# Patient Record
Sex: FEMALE | Race: WHITE | NOT HISPANIC OR LATINO | Employment: OTHER | URBAN - METROPOLITAN AREA
[De-identification: names, ages, dates, MRNs, and addresses within clinical notes are randomized per-mention and may not be internally consistent; named-entity substitution may affect disease eponyms.]

---

## 2021-01-01 ENCOUNTER — OFFICE VISIT (OUTPATIENT)
Dept: URBAN - METROPOLITAN AREA CLINIC 68 | Facility: CLINIC | Age: 71
End: 2021-01-01

## 2021-03-01 ENCOUNTER — OFFICE VISIT (OUTPATIENT)
Dept: URBAN - METROPOLITAN AREA CLINIC 68 | Facility: CLINIC | Age: 71
End: 2021-03-01

## 2021-03-02 ENCOUNTER — OFFICE VISIT (OUTPATIENT)
Dept: URBAN - METROPOLITAN AREA CLINIC 68 | Facility: CLINIC | Age: 71
End: 2021-03-02

## 2021-03-08 ENCOUNTER — OFFICE VISIT (OUTPATIENT)
Dept: URBAN - METROPOLITAN AREA SURGERY CENTER 12 | Facility: SURGERY CENTER | Age: 71
End: 2021-03-08

## 2021-03-09 ENCOUNTER — LAB OUTSIDE AN ENCOUNTER (OUTPATIENT)
Dept: URBAN - METROPOLITAN AREA CLINIC 68 | Facility: CLINIC | Age: 71
End: 2021-03-09

## 2021-03-09 LAB — 01: (no result)

## 2021-03-11 ENCOUNTER — TELEPHONE ENCOUNTER (OUTPATIENT)
Dept: URBAN - METROPOLITAN AREA CLINIC 68 | Facility: CLINIC | Age: 71
End: 2021-03-11

## 2021-04-22 ENCOUNTER — NEW PATIENT COMPREHENSIVE (OUTPATIENT)
Dept: URBAN - METROPOLITAN AREA CLINIC 32 | Facility: CLINIC | Age: 71
End: 2021-04-22

## 2021-04-22 DIAGNOSIS — H25.13: ICD-10-CM

## 2021-04-22 DIAGNOSIS — H25.013: ICD-10-CM

## 2021-04-22 DIAGNOSIS — H11.122: ICD-10-CM

## 2021-04-22 PROCEDURE — 65210 REMOVE FOREIGN BODY FROM EYE: CPT

## 2021-04-22 PROCEDURE — 92004 COMPRE OPH EXAM NEW PT 1/>: CPT

## 2021-04-22 PROCEDURE — 92015 DETERMINE REFRACTIVE STATE: CPT

## 2021-04-22 RX ORDER — HYDROXYZINE HYDROCHLORIDE 25 MG/1
1 TABLET, FILM COATED ORAL TWICE A DAY
Start: 2021-04-22 | End: 2021-04-25

## 2021-04-22 ASSESSMENT — KERATOMETRY
OS_K1POWER_DIOPTERS: 47.00
OD_AXISANGLE2_DEGREES: 113
OS_K2POWER_DIOPTERS: 46.50
OD_K1POWER_DIOPTERS: 47.50
OD_AXISANGLE_DEGREES: 23
OD_K2POWER_DIOPTERS: 46.25
OS_AXISANGLE_DEGREES: 124
OS_AXISANGLE2_DEGREES: 34

## 2021-04-22 ASSESSMENT — VISUAL ACUITY
OS_CC: 20/30
OS_SC: 20/40+2
OD_CC: 20/30
OD_SC: 20/40
OD_PH: 20/30
OD_CC: J1
OS_CC: J2
OS_PH: 20/30-1

## 2021-04-22 ASSESSMENT — TONOMETRY
OD_IOP_MMHG: 13
OS_IOP_MMHG: 14

## 2021-09-01 ENCOUNTER — OFFICE VISIT (OUTPATIENT)
Dept: URBAN - METROPOLITAN AREA CLINIC 68 | Facility: CLINIC | Age: 71
End: 2021-09-01

## 2022-03-03 ENCOUNTER — OFFICE VISIT (OUTPATIENT)
Dept: URBAN - METROPOLITAN AREA CLINIC 68 | Facility: CLINIC | Age: 72
End: 2022-03-03

## 2022-03-04 ENCOUNTER — TELEPHONE ENCOUNTER (OUTPATIENT)
Dept: URBAN - METROPOLITAN AREA CLINIC 68 | Facility: CLINIC | Age: 72
End: 2022-03-04

## 2022-03-09 ENCOUNTER — OFFICE VISIT (OUTPATIENT)
Dept: URBAN - METROPOLITAN AREA CLINIC 68 | Facility: CLINIC | Age: 72
End: 2022-03-09

## 2022-03-21 ENCOUNTER — OFFICE VISIT (OUTPATIENT)
Dept: URBAN - METROPOLITAN AREA CLINIC 68 | Facility: CLINIC | Age: 72
End: 2022-03-21

## 2022-03-29 ENCOUNTER — OFFICE VISIT (OUTPATIENT)
Dept: URBAN - METROPOLITAN AREA CLINIC 68 | Facility: CLINIC | Age: 72
End: 2022-03-29

## 2022-04-25 ENCOUNTER — COMPREHENSIVE EXAM (OUTPATIENT)
Dept: URBAN - METROPOLITAN AREA CLINIC 32 | Facility: CLINIC | Age: 72
End: 2022-04-25

## 2022-04-25 DIAGNOSIS — H25.13: ICD-10-CM

## 2022-04-25 DIAGNOSIS — H25.013: ICD-10-CM

## 2022-04-25 DIAGNOSIS — H35.373: ICD-10-CM

## 2022-04-25 PROCEDURE — 92134 CPTRZ OPH DX IMG PST SGM RTA: CPT

## 2022-04-25 PROCEDURE — 92014 COMPRE OPH EXAM EST PT 1/>: CPT

## 2022-04-25 PROCEDURE — 92015 DETERMINE REFRACTIVE STATE: CPT

## 2022-04-25 ASSESSMENT — KERATOMETRY
OD_AXISANGLE_DEGREES: 23
OS_K2POWER_DIOPTERS: 46.50
OD_K1POWER_DIOPTERS: 47.50
OS_AXISANGLE2_DEGREES: 34
OS_AXISANGLE_DEGREES: 124
OD_K2POWER_DIOPTERS: 46.25
OS_K1POWER_DIOPTERS: 47.00
OD_AXISANGLE2_DEGREES: 113

## 2022-04-25 ASSESSMENT — VISUAL ACUITY
OS_CC: 20/50
OD_CC: J1+
OS_CC: J1+
OD_CC: 20/40
OS_GLARE: 20/80
OD_GLARE: 20/100

## 2022-04-25 ASSESSMENT — TONOMETRY
OS_IOP_MMHG: 16
OD_IOP_MMHG: 13

## 2022-04-29 ENCOUNTER — OFFICE VISIT (OUTPATIENT)
Dept: URBAN - METROPOLITAN AREA SURGERY CENTER 12 | Facility: SURGERY CENTER | Age: 72
End: 2022-04-29

## 2022-05-02 ENCOUNTER — LAB OUTSIDE AN ENCOUNTER (OUTPATIENT)
Age: 72
End: 2022-05-02

## 2022-05-02 LAB — 01: (no result)

## 2022-05-10 ENCOUNTER — TELEPHONE ENCOUNTER (OUTPATIENT)
Dept: URBAN - METROPOLITAN AREA CLINIC 68 | Facility: CLINIC | Age: 72
End: 2022-05-10

## 2022-06-04 ENCOUNTER — TELEPHONE ENCOUNTER (OUTPATIENT)
Dept: URBAN - METROPOLITAN AREA CLINIC 68 | Facility: CLINIC | Age: 72
End: 2022-06-04

## 2022-06-04 RX ORDER — ESZOPICLONE 3 MG/1
ESZOPICLONE( 3MG ORAL  AS DIRECTED ) INACTIVE -HX ENTRY TABLET, FILM COATED OROPHARYNGEAL AS DIRECTED
OUTPATIENT
Start: 2021-03-02

## 2022-06-04 RX ORDER — ESZOPICLONE 3 MG/1
LUNESTA( 3MG ORAL 1 AT BEDTIME ) INACTIVE -HX ENTRY TABLET, COATED ORAL AT BEDTIME
OUTPATIENT
Start: 2020-03-12

## 2022-06-04 RX ORDER — POLYETHYLENE GLYCOL 3350, SODIUM SULFATE, SODIUM CHLORIDE, POTASSIUM CHLORIDE, ASCORBIC ACID, SODIUM ASCORBATE 7.5-2.691G
KIT ORAL AS DIRECTED
Qty: 1 | Refills: 0 | OUTPATIENT
Start: 2014-08-04 | End: 2014-08-05

## 2022-06-04 RX ORDER — OMEPRAZOLE 40 MG/1
OMEPRAZOLE( 40MG ORAL   ) UNDEFINED -HX ENTRY CAPSULE, DELAYED RELEASE PELLETS ORAL
OUTPATIENT
Start: 2016-10-31

## 2022-06-04 RX ORDER — HYDROCODONE BITARTRATE AND ACETAMINOPHEN 10; 325 MG/1; MG/1
HYDROCODONE-ACETAMINOPHEN( 10-325MG ORAL   ) UNDEFINED -HX ENTRY TABLET ORAL
OUTPATIENT
Start: 2016-11-03

## 2022-06-04 RX ORDER — SODIUM SULFATE, POTASSIUM SULFATE, MAGNESIUM SULFATE 17.5; 3.13; 1.6 G/ML; G/ML; G/ML
SOLUTION, CONCENTRATE ORAL AS DIRECTED
Qty: 1 | Refills: 0 | OUTPATIENT
Start: 2021-03-02 | End: 2021-03-03

## 2022-06-04 RX ORDER — VALACYCLOVIR 1 G/1
VALACYCLOVIR HCL( 1GM ORAL   ) UNDEFINED -HX ENTRY TABLET, FILM COATED ORAL
OUTPATIENT
Start: 2016-11-03

## 2022-06-04 RX ORDER — CANAGLIFLOZIN 300 MG/1
INVOKANA( 300MG ORAL 1 DAILY ) INACTIVE -HX ENTRY TABLET, FILM COATED ORAL DAILY
OUTPATIENT
Start: 2020-03-12

## 2022-06-04 RX ORDER — ESZOPICLONE 3 MG/1
ESZOPICLONE( 3MG ORAL   ) UNDEFINED -HX ENTRY TABLET, FILM COATED OROPHARYNGEAL
OUTPATIENT
Start: 2016-11-03

## 2022-06-04 RX ORDER — TIZANIDINE HYDROCHLORIDE 4 MG/1
TIZANIDINE HCL( 4MG ORAL   ) UNDEFINED -HX ENTRY CAPSULE ORAL
OUTPATIENT
Start: 2016-10-31

## 2022-06-04 RX ORDER — CANAGLIFLOZIN 300 MG/1
INVOKANA( 300MG ORAL  DAILY ) INACTIVE -HX ENTRY TABLET, FILM COATED ORAL DAILY
OUTPATIENT
Start: 2021-03-02

## 2022-06-04 RX ORDER — EFINACONAZOLE 100 MG/ML
JUBLIA( 10% EXTERNAL   ) UNDEFINED -HX ENTRY SOLUTION TOPICAL
OUTPATIENT
Start: 2016-11-03

## 2022-06-05 ENCOUNTER — TELEPHONE ENCOUNTER (OUTPATIENT)
Dept: URBAN - METROPOLITAN AREA CLINIC 68 | Facility: CLINIC | Age: 72
End: 2022-06-05

## 2022-06-05 RX ORDER — ROSUVASTATIN CALCIUM 20 MG
CRESTOR( 20MG ORAL 1 DAILY ) ACTIVE -HX ENTRY TABLET ORAL DAILY
Status: ACTIVE | COMMUNITY
Start: 2022-03-03

## 2022-06-05 RX ORDER — TIZANIDINE HYDROCHLORIDE 4 MG/1
ZANAFLEX( 4MG ORAL 1 TWO TIMES DAILY ) ACTIVE -HX ENTRY CAPSULE ORAL
Status: ACTIVE | COMMUNITY
Start: 2022-03-03

## 2022-06-05 RX ORDER — OMEPRAZOLE 40 MG/1
OMEPRAZOLE( 40MG ORAL 1 DAILY ) ACTIVE -HX ENTRY CAPSULE, DELAYED RELEASE PELLETS ORAL DAILY
Status: ACTIVE | COMMUNITY
Start: 2022-03-03

## 2022-06-05 RX ORDER — CHLORHEXIDINE GLUCONATE 4 %
MULTIVITAMIN ADULTS(  ORAL   ) ACTIVE -HX ENTRY LIQUID (ML) TOPICAL
Status: ACTIVE | COMMUNITY
Start: 2022-03-03

## 2022-06-05 RX ORDER — AZATHIOPRINE 50 1/1
AZATHIOPRINE( 50MG ORAL 1 DAILY ) ACTIVE -HX ENTRY TABLET ORAL DAILY
Status: ACTIVE | COMMUNITY
Start: 2022-03-03

## 2022-06-05 RX ORDER — METFORMIN HYDROCHLORIDE 500 MG/1
METFORMIN HCL( 500MG ORAL 1 TWO TIMES DAILY ) ACTIVE -HX ENTRY TABLET, COATED ORAL
Status: ACTIVE | COMMUNITY
Start: 2022-03-03

## 2022-06-25 ENCOUNTER — TELEPHONE ENCOUNTER (OUTPATIENT)
Age: 72
End: 2022-06-25

## 2022-06-25 RX ORDER — VALACYCLOVIR 1 G/1
VALACYCLOVIR HCL( 1GM ORAL   ) UNDEFINED -HX ENTRY TABLET, FILM COATED ORAL
OUTPATIENT
Start: 2016-11-03

## 2022-06-25 RX ORDER — HYDROCODONE BITARTRATE AND ACETAMINOPHEN 325; 10 MG/1; MG/1
HYDROCODONE-ACETAMINOPHEN( 10-325MG ORAL   ) UNDEFINED -HX ENTRY TABLET ORAL
OUTPATIENT
Start: 2016-11-03

## 2022-06-25 RX ORDER — OMEPRAZOLE 40 MG/1
OMEPRAZOLE( 40MG ORAL   ) UNDEFINED -HX ENTRY CAPSULE, DELAYED RELEASE ORAL
OUTPATIENT
Start: 2016-10-31

## 2022-06-25 RX ORDER — TIZANIDINE HYDROCHLORIDE 4 MG/1
TIZANIDINE HCL( 4MG ORAL   ) UNDEFINED -HX ENTRY CAPSULE ORAL
OUTPATIENT
Start: 2016-10-31

## 2022-06-25 RX ORDER — ESZOPICLONE 3 MG/1
ESZOPICLONE( 3MG ORAL   ) UNDEFINED -HX ENTRY TABLET, FILM COATED ORAL
OUTPATIENT
Start: 2016-11-03

## 2022-06-25 RX ORDER — CANAGLIFLOZIN 300 MG/1
INVOKANA( 300MG ORAL  DAILY ) INACTIVE -HX ENTRY TABLET, FILM COATED ORAL DAILY
OUTPATIENT
Start: 2021-03-02

## 2022-06-25 RX ORDER — OMEGA-3/DHA/EPA/FISH OIL 1000 MG
FISH OIL( 1000MG ORAL  DAILY ) INACTIVE -HX ENTRY CAPSULE ORAL DAILY
OUTPATIENT
Start: 2021-03-02

## 2022-06-25 RX ORDER — POLYETHYLENE GLYCOL 3350, SODIUM SULFATE, SODIUM CHLORIDE, POTASSIUM CHLORIDE, ASCORBIC ACID, SODIUM ASCORBATE 7.5-2.691G
KIT ORAL AS DIRECTED
Qty: 1 | Refills: 0 | OUTPATIENT
Start: 2014-08-04 | End: 2014-08-05

## 2022-06-25 RX ORDER — EFINACONAZOLE 100 MG/ML
JUBLIA( 10% EXTERNAL   ) UNDEFINED -HX ENTRY SOLUTION TOPICAL
OUTPATIENT
Start: 2016-11-03

## 2022-06-25 RX ORDER — ESZOPICLONE 3 MG/1
ESZOPICLONE( 3MG ORAL  AS DIRECTED ) INACTIVE -HX ENTRY TABLET, FILM COATED ORAL AS DIRECTED
OUTPATIENT
Start: 2021-03-02

## 2022-06-25 RX ORDER — SODIUM SULFATE, POTASSIUM SULFATE, MAGNESIUM SULFATE 17.5; 3.13; 1.6 G/ML; G/ML; G/ML
SOLUTION, CONCENTRATE ORAL AS DIRECTED
Qty: 1 | Refills: 0 | OUTPATIENT
Start: 2021-03-02 | End: 2021-03-03

## 2022-06-25 RX ORDER — OMEPRAZOLE MAGNESIUM 10 MG/1
PRILOSEC( 40MG ORAL  DAILY ) INACTIVE -HX ENTRY GRANULE, DELAYED RELEASE ORAL DAILY
OUTPATIENT
Start: 2020-03-12

## 2022-06-25 RX ORDER — CANAGLIFLOZIN 300 MG/1
INVOKANA( 300MG ORAL 1 DAILY ) INACTIVE -HX ENTRY TABLET, FILM COATED ORAL DAILY
OUTPATIENT
Start: 2020-03-12

## 2022-06-25 RX ORDER — ESZOPICLONE 3 MG/1
LUNESTA( 3MG ORAL 1 AT BEDTIME ) INACTIVE -HX ENTRY TABLET, COATED ORAL AT BEDTIME
OUTPATIENT
Start: 2020-03-12

## 2022-06-26 ENCOUNTER — TELEPHONE ENCOUNTER (OUTPATIENT)
Age: 72
End: 2022-06-26

## 2022-06-26 RX ORDER — HYDROCODONE BITARTRATE AND ACETAMINOPHEN 10; 300 MG/1; MG/1
HYDROCODONE-ACETAMINOPHEN( 10-500MG ORAL 1 AS DIRECTED ) ACTIVE -HX ENTRY TABLET ORAL AS DIRECTED
Status: ACTIVE | COMMUNITY
Start: 2022-03-03

## 2022-06-26 RX ORDER — CHLORHEXIDINE GLUCONATE 4 %
MULTIVITAMIN ADULTS(  ORAL   ) ACTIVE -HX ENTRY LIQUID (ML) TOPICAL
Status: ACTIVE | COMMUNITY
Start: 2022-03-03

## 2022-06-26 RX ORDER — METFORMIN HCL 500 MG/1
METFORMIN HCL( 500MG ORAL 1 TWO TIMES DAILY ) ACTIVE -HX ENTRY TABLET ORAL
Status: ACTIVE | COMMUNITY
Start: 2022-03-03

## 2022-06-26 RX ORDER — ROSUVASTATIN CALCIUM 20 MG
CRESTOR( 20MG ORAL 1 DAILY ) ACTIVE -HX ENTRY TABLET ORAL DAILY
Status: ACTIVE | COMMUNITY
Start: 2022-03-03

## 2022-06-26 RX ORDER — ASPIRIN 81 MG/1
LOW-DOSE ASPIRIN( 81MG ORAL  DAILY ) ACTIVE -HX ENTRY TABLET, COATED ORAL DAILY
Status: ACTIVE | COMMUNITY
Start: 2022-03-03

## 2022-06-26 RX ORDER — AZATHIOPRINE 50 MG/1
AZATHIOPRINE( 50MG ORAL 1 DAILY ) ACTIVE -HX ENTRY TABLET ORAL DAILY
Status: ACTIVE | COMMUNITY
Start: 2022-03-03

## 2022-06-26 RX ORDER — CHOLECALCIFEROL (VITAMIN D3) 25 MCG
VITAMIN D( 1000UNIT ORAL  DAILY ) ACTIVE -HX ENTRY TABLET ORAL DAILY
Status: ACTIVE | COMMUNITY
Start: 2022-03-03

## 2022-06-26 RX ORDER — OMEPRAZOLE 40 MG/1
OMEPRAZOLE( 40MG ORAL 1 DAILY ) ACTIVE -HX ENTRY CAPSULE, DELAYED RELEASE ORAL DAILY
Status: ACTIVE | COMMUNITY
Start: 2022-03-03

## 2022-06-26 RX ORDER — TIZANIDINE HYDROCHLORIDE 4 MG/1
ZANAFLEX( 4MG ORAL 1 TWO TIMES DAILY ) ACTIVE -HX ENTRY CAPSULE ORAL
Status: ACTIVE | COMMUNITY
Start: 2022-03-03

## 2022-09-19 ENCOUNTER — DIAGNOSTICS ONLY (OUTPATIENT)
Dept: URBAN - METROPOLITAN AREA CLINIC 32 | Facility: CLINIC | Age: 72
End: 2022-09-19

## 2022-09-19 ENCOUNTER — ADDENDUM (OUTPATIENT)
Dept: URBAN - METROPOLITAN AREA CLINIC 32 | Facility: CLINIC | Age: 72
End: 2022-09-19

## 2022-09-19 DIAGNOSIS — H25.013: ICD-10-CM

## 2022-09-19 DIAGNOSIS — E11.9: ICD-10-CM

## 2022-09-19 DIAGNOSIS — H35.373: ICD-10-CM

## 2022-09-19 DIAGNOSIS — H25.13: ICD-10-CM

## 2022-09-19 PROCEDURE — 92025 CPTRIZED CORNEAL TOPOGRAPHY: CPT

## 2022-09-19 PROCEDURE — 92134 CPTRZ OPH DX IMG PST SGM RTA: CPT

## 2022-09-19 PROCEDURE — V2799PMN IMPRIMIS PRED-MOXI-NEPAF 5ML

## 2022-09-19 PROCEDURE — 92286 ANT SGM IMG I&R SPECLR MIC: CPT

## 2022-09-19 PROCEDURE — 99214 OFFICE O/P EST MOD 30 MIN: CPT

## 2022-09-19 PROCEDURE — 92136TC INTERFEROMETRY - TECHNICAL COMPONENT

## 2022-09-19 ASSESSMENT — VISUAL ACUITY
OS_CC: J1+
OD_CC: J1+
OD_SC: 20/40
OS_GLARE: 20/80
OS_SC: 20/40
OS_SC: J1
OD_GLARE: 20/100
OD_CC: 20/25-2
OS_CC: 20/30
OD_SC: J1

## 2022-09-19 ASSESSMENT — KERATOMETRY
OD_K1POWER_DIOPTERS: 47.50
OD_AXISANGLE_DEGREES: 23
OS_K1POWER_DIOPTERS: 47.00
OS_AXISANGLE_DEGREES: 124
OD_AXISANGLE2_DEGREES: 113
OS_K2POWER_DIOPTERS: 46.50
OD_K2POWER_DIOPTERS: 46.25
OS_AXISANGLE2_DEGREES: 34

## 2022-09-20 ASSESSMENT — KERATOMETRY
OD_AXISANGLE_DEGREES: 23
OS_AXISANGLE_DEGREES: 124
OD_K2POWER_DIOPTERS: 46.25
OS_K1POWER_DIOPTERS: 47.00
OS_K2POWER_DIOPTERS: 46.50
OD_AXISANGLE2_DEGREES: 113
OS_AXISANGLE2_DEGREES: 34
OD_K1POWER_DIOPTERS: 47.50

## 2022-09-26 ENCOUNTER — ADDENDUM (OUTPATIENT)
Dept: URBAN - METROPOLITAN AREA CLINIC 32 | Facility: CLINIC | Age: 72
End: 2022-09-26

## 2022-09-26 ASSESSMENT — KERATOMETRY
OS_K1POWER_DIOPTERS: 47.00
OD_AXISANGLE_DEGREES: 23
OD_AXISANGLE2_DEGREES: 113
OD_K2POWER_DIOPTERS: 46.25
OD_K1POWER_DIOPTERS: 47.50
OS_AXISANGLE_DEGREES: 124
OS_K2POWER_DIOPTERS: 46.50
OS_AXISANGLE2_DEGREES: 34

## 2022-09-27 ENCOUNTER — POST-OP (OUTPATIENT)
Dept: URBAN - METROPOLITAN AREA CLINIC 32 | Facility: CLINIC | Age: 72
End: 2022-09-27

## 2022-09-27 ENCOUNTER — SURGERY/PROCEDURE (OUTPATIENT)
Dept: URBAN - METROPOLITAN AREA CLINIC 32 | Facility: CLINIC | Age: 72
End: 2022-09-27

## 2022-09-27 DIAGNOSIS — H25.012: ICD-10-CM

## 2022-09-27 DIAGNOSIS — H25.12: ICD-10-CM

## 2022-09-27 DIAGNOSIS — Z96.1: ICD-10-CM

## 2022-09-27 PROCEDURE — 99024 POSTOP FOLLOW-UP VISIT: CPT

## 2022-09-27 PROCEDURE — 66984AV REMOVE CATARACT, INSERT ADVANCED LENS

## 2022-09-27 ASSESSMENT — KERATOMETRY
OD_K2POWER_DIOPTERS: 46.25
OS_AXISANGLE2_DEGREES: 34
OS_AXISANGLE_DEGREES: 124
OS_K1POWER_DIOPTERS: 47.00
OD_K1POWER_DIOPTERS: 47.50
OD_AXISANGLE_DEGREES: 23
OD_AXISANGLE2_DEGREES: 113
OS_K2POWER_DIOPTERS: 46.50

## 2022-09-27 ASSESSMENT — TONOMETRY: OD_IOP_MMHG: 11

## 2022-09-27 ASSESSMENT — VISUAL ACUITY: OD_SC: 20/70-1

## 2022-09-30 ASSESSMENT — KERATOMETRY
OD_AXISANGLE2_DEGREES: 113
OS_K1POWER_DIOPTERS: 47.00
OS_AXISANGLE2_DEGREES: 34
OD_AXISANGLE_DEGREES: 23
OS_K2POWER_DIOPTERS: 46.50
OS_AXISANGLE_DEGREES: 124
OD_K1POWER_DIOPTERS: 47.50
OD_K2POWER_DIOPTERS: 46.25

## 2022-10-03 ENCOUNTER — POST-OP (OUTPATIENT)
Dept: URBAN - METROPOLITAN AREA CLINIC 32 | Facility: CLINIC | Age: 72
End: 2022-10-03

## 2022-10-03 DIAGNOSIS — Z96.1: ICD-10-CM

## 2022-10-03 DIAGNOSIS — H25.11: ICD-10-CM

## 2022-10-03 PROCEDURE — 99213 OFFICE O/P EST LOW 20 MIN: CPT

## 2022-10-03 PROCEDURE — V2799PMN IMPRIMIS PRED-MOXI-NEPAF 5ML

## 2022-10-03 ASSESSMENT — KERATOMETRY
OS_AXISANGLE_DEGREES: 124
OS_K1POWER_DIOPTERS: 47.00
OD_AXISANGLE2_DEGREES: 113
OD_AXISANGLE_DEGREES: 23
OS_AXISANGLE2_DEGREES: 34
OD_K1POWER_DIOPTERS: 47.50
OS_K2POWER_DIOPTERS: 46.50
OD_K2POWER_DIOPTERS: 46.25

## 2022-10-03 ASSESSMENT — TONOMETRY: OS_IOP_MMHG: 11

## 2022-10-03 ASSESSMENT — VISUAL ACUITY
OD_GLARE: 20/100
OD_SC: 20/40
OS_SC: 20/30

## 2022-10-11 ENCOUNTER — SURGERY/PROCEDURE (OUTPATIENT)
Dept: URBAN - METROPOLITAN AREA CLINIC 32 | Facility: CLINIC | Age: 72
End: 2022-10-11

## 2022-10-11 DIAGNOSIS — H25.11: ICD-10-CM

## 2022-10-11 PROCEDURE — 66984AV REMOVE CATARACT, INSERT ADVANCED LENS

## 2022-10-12 ENCOUNTER — POST-OP (OUTPATIENT)
Dept: URBAN - METROPOLITAN AREA CLINIC 32 | Facility: CLINIC | Age: 72
End: 2022-10-12

## 2022-10-12 DIAGNOSIS — Z96.1: ICD-10-CM

## 2022-10-12 PROCEDURE — 99024 POSTOP FOLLOW-UP VISIT: CPT

## 2022-10-12 ASSESSMENT — KERATOMETRY
OD_K1POWER_DIOPTERS: 47.50
OS_K1POWER_DIOPTERS: 47.00
OS_K1POWER_DIOPTERS: 47.00
OS_AXISANGLE2_DEGREES: 34
OS_AXISANGLE_DEGREES: 124
OD_AXISANGLE2_DEGREES: 113
OD_K1POWER_DIOPTERS: 47.50
OD_K2POWER_DIOPTERS: 46.25
OD_AXISANGLE_DEGREES: 23
OS_AXISANGLE_DEGREES: 124
OS_AXISANGLE2_DEGREES: 34
OS_K2POWER_DIOPTERS: 46.50
OD_K2POWER_DIOPTERS: 46.25
OD_AXISANGLE2_DEGREES: 113
OS_K2POWER_DIOPTERS: 46.50
OD_AXISANGLE_DEGREES: 23

## 2022-10-12 ASSESSMENT — TONOMETRY: OD_IOP_MMHG: 12

## 2022-10-12 ASSESSMENT — VISUAL ACUITY: OD_SC: 20/200

## 2022-10-17 ENCOUNTER — POST-OP (OUTPATIENT)
Dept: URBAN - METROPOLITAN AREA CLINIC 32 | Facility: CLINIC | Age: 72
End: 2022-10-17

## 2022-10-17 DIAGNOSIS — Z96.1: ICD-10-CM

## 2022-10-17 PROCEDURE — 99024 POSTOP FOLLOW-UP VISIT: CPT

## 2022-10-17 ASSESSMENT — KERATOMETRY
OD_K1POWER_DIOPTERS: 47.50
OS_AXISANGLE2_DEGREES: 34
OD_AXISANGLE_DEGREES: 23
OD_K2POWER_DIOPTERS: 46.25
OD_K2POWER_DIOPTERS: 47.25
OD_AXISANGLE_DEGREES: 20
OD_AXISANGLE2_DEGREES: 110
OD_K1POWER_DIOPTERS: 48.00
OS_AXISANGLE_DEGREES: 124
OD_AXISANGLE2_DEGREES: 113
OS_K1POWER_DIOPTERS: 47.00
OS_K2POWER_DIOPTERS: 46.50

## 2022-10-17 ASSESSMENT — VISUAL ACUITY
OS_SC: 20/30-2
OD_SC: J2
OD_SC: 20/40-2

## 2022-11-14 ENCOUNTER — POST-OP (OUTPATIENT)
Dept: URBAN - METROPOLITAN AREA CLINIC 32 | Facility: CLINIC | Age: 72
End: 2022-11-14

## 2022-11-14 DIAGNOSIS — H35.373: ICD-10-CM

## 2022-11-14 DIAGNOSIS — Z96.1: ICD-10-CM

## 2022-11-14 DIAGNOSIS — H16.223: ICD-10-CM

## 2022-11-14 PROCEDURE — 99024 POSTOP FOLLOW-UP VISIT: CPT

## 2022-11-14 PROCEDURE — 92134 CPTRZ OPH DX IMG PST SGM RTA: CPT

## 2022-11-14 ASSESSMENT — VISUAL ACUITY
OD_SC: 20/30-2
OS_SC: J1
OS_SC: 20/40
OD_SC: J2

## 2022-11-14 ASSESSMENT — TONOMETRY
OD_IOP_MMHG: 13
OS_IOP_MMHG: 14

## 2023-06-26 ENCOUNTER — WEB ENCOUNTER (OUTPATIENT)
Dept: URBAN - METROPOLITAN AREA CLINIC 68 | Facility: CLINIC | Age: 73
End: 2023-06-26

## 2023-06-26 ENCOUNTER — OFFICE VISIT (OUTPATIENT)
Dept: URBAN - METROPOLITAN AREA CLINIC 68 | Facility: CLINIC | Age: 73
End: 2023-06-26
Payer: MEDICARE

## 2023-06-26 ENCOUNTER — LAB OUTSIDE AN ENCOUNTER (OUTPATIENT)
Dept: URBAN - METROPOLITAN AREA CLINIC 68 | Facility: CLINIC | Age: 73
End: 2023-06-26

## 2023-06-26 VITALS
HEIGHT: 63 IN | SYSTOLIC BLOOD PRESSURE: 118 MMHG | DIASTOLIC BLOOD PRESSURE: 80 MMHG | BODY MASS INDEX: 27.46 KG/M2 | WEIGHT: 155 LBS

## 2023-06-26 DIAGNOSIS — K74.69 CIRRHOSIS, CRYPTOGENIC: ICD-10-CM

## 2023-06-26 DIAGNOSIS — E11.8 TYPE 2 DIABETES MELLITUS WITH UNSPECIFIED COMPLICATIONS: ICD-10-CM

## 2023-06-26 DIAGNOSIS — K75.81 NASH (NONALCOHOLIC STEATOHEPATITIS): ICD-10-CM

## 2023-06-26 DIAGNOSIS — Z86.010 PERSONAL HISTORY OF COLONIC POLYPS: ICD-10-CM

## 2023-06-26 DIAGNOSIS — K21.00 GASTRO-ESOPHAGEAL REFLUX DISEASE WITH ESOPHAGITIS: ICD-10-CM

## 2023-06-26 PROBLEM — 442685003: Status: ACTIVE | Noted: 2023-06-26

## 2023-06-26 PROCEDURE — 99214 OFFICE O/P EST MOD 30 MIN: CPT | Performed by: SPECIALIST

## 2023-06-26 RX ORDER — EMPAGLIFLOZIN 10 MG/1
1 TABLET TABLET, FILM COATED ORAL ONCE A DAY
Status: ACTIVE | COMMUNITY

## 2023-06-26 RX ORDER — OMEPRAZOLE 40 MG/1
OMEPRAZOLE( 40MG ORAL 1 DAILY ) ACTIVE -HX ENTRY CAPSULE, DELAYED RELEASE ORAL DAILY
OUTPATIENT
Start: 2022-03-03

## 2023-06-26 RX ORDER — AZATHIOPRINE 50 MG/1
AZATHIOPRINE( 50MG ORAL 1 DAILY ) ACTIVE -HX ENTRY TABLET ORAL DAILY
Status: ACTIVE | COMMUNITY
Start: 2022-03-03

## 2023-06-26 RX ORDER — CHLORHEXIDINE GLUCONATE 4 %
MULTIVITAMIN ADULTS(  ORAL   ) ACTIVE -HX ENTRY LIQUID (ML) TOPICAL
COMMUNITY
Start: 2022-03-03

## 2023-06-26 RX ORDER — METFORMIN HCL 500 MG/1
METFORMIN HCL( 500MG ORAL 1 TWO TIMES DAILY ) ACTIVE -HX ENTRY TABLET ORAL
Status: ACTIVE | COMMUNITY
Start: 2022-03-03

## 2023-06-26 RX ORDER — ASPIRIN 81 MG/1
LOW-DOSE ASPIRIN( 81MG ORAL  DAILY ) ACTIVE -HX ENTRY TABLET, COATED ORAL DAILY
COMMUNITY
Start: 2022-03-03

## 2023-06-26 RX ORDER — OMEPRAZOLE 40 MG/1
OMEPRAZOLE( 40MG ORAL 1 DAILY ) ACTIVE -HX ENTRY CAPSULE, DELAYED RELEASE ORAL DAILY
Status: ACTIVE | COMMUNITY
Start: 2022-03-03

## 2023-06-26 RX ORDER — ROSUVASTATIN CALCIUM 20 MG/1
1 TABLET TABLET, COATED ORAL ONCE A DAY
Status: ACTIVE | COMMUNITY

## 2023-06-26 RX ORDER — TIZANIDINE HYDROCHLORIDE 4 MG/1
ZANAFLEX( 4MG ORAL 1 TWO TIMES DAILY ) ACTIVE -HX ENTRY CAPSULE ORAL
Status: ACTIVE | COMMUNITY
Start: 2022-03-03

## 2023-06-26 NOTE — HPI-TODAY'S VISIT:
Known history of cirrhosis cryptogenic probably due to steatohepatitis no significant alcohol history in the past patient does have obesity and diabetes currently on therapy with Jardiance and Elsy blood glucose level was 150 Patient was seen to have grade 1 varices in 2022 EGD 2022 colonoscopy negative for lesions No sign of ascites bleeding or other complications  Last FibroScan F3 fibrosis 1 year ago No alarm symptoms of lower GI bleeding or other symptoms

## 2023-06-28 LAB
ABSOLUTE BASOPHILS: 41
ABSOLUTE EOSINOPHILS: 271
ABSOLUTE LYMPHOCYTES: 968
ABSOLUTE MONOCYTES: 394
ABSOLUTE NEUTROPHILS: 2427
AFP, SERUM, TUMOR MARKER: 2.4
ALBUMIN/GLOBULIN RATIO: 1.7
ALBUMIN: 4.2
ALKALINE PHOSPHATASE: 62
ALT (SGPT): 12
AST (SGOT): 19
BASOPHILS: 1
BILIRUBIN, DIRECT: 0.1
BILIRUBIN, INDIRECT: 0.5
BILIRUBIN, TOTAL: 0.6
EOSINOPHILS: 6.6
GLOBULIN: 2.5
HEMATOCRIT: 39.6
HEMOGLOBIN: 12.9
INR: 1.1
LYMPHOCYTES: 23.6
MCH: 27.9
MCHC: 32.6
MCV: 85.5
MONOCYTES: 9.6
MPV: 10.1
NEUTROPHILS: 59.2
PLATELET COUNT: 197
PROTEIN, TOTAL: 6.7
PT: 11.2
RDW: 13.1
RED BLOOD CELL COUNT: 4.63
WHITE BLOOD CELL COUNT: 4.1

## 2023-07-18 ENCOUNTER — CLAIMS CREATED FROM THE CLAIM WINDOW (OUTPATIENT)
Dept: URBAN - METROPOLITAN AREA CLINIC 67 | Facility: CLINIC | Age: 73
End: 2023-07-18
Payer: MEDICARE

## 2023-07-18 DIAGNOSIS — K76.0 FATTY (CHANGE OF) LIVER, NOT ELSEWHERE CLASSIFIED: ICD-10-CM

## 2023-07-18 DIAGNOSIS — Z90.49 ACQUIRED ABSENCE OF OTHER SPECIFIED PARTS OF DIGESTIVE TRACT: ICD-10-CM

## 2023-07-18 DIAGNOSIS — K74.60 CIRRHOSIS OF LIVER WITHOUT ASCITES, UNSPECIFIED HEPATIC CIRRHOSIS TYPE: ICD-10-CM

## 2023-07-18 PROCEDURE — 93976 VASCULAR STUDY: CPT | Performed by: SPECIALIST

## 2023-07-18 PROCEDURE — 76705 ECHO EXAM OF ABDOMEN: CPT | Performed by: SPECIALIST

## 2023-07-25 ENCOUNTER — CLAIMS CREATED FROM THE CLAIM WINDOW (OUTPATIENT)
Dept: URBAN - METROPOLITAN AREA CLINIC 67 | Facility: CLINIC | Age: 73
End: 2023-07-25
Payer: MEDICARE

## 2023-07-25 DIAGNOSIS — K74.69 OTHER CIRRHOSIS OF LIVER: ICD-10-CM

## 2023-07-25 PROCEDURE — 76981 USE PARENCHYMA: CPT | Performed by: SPECIALIST

## 2023-07-25 PROCEDURE — 91200 LIVER ELASTOGRAPHY: CPT | Performed by: SPECIALIST

## 2023-07-27 ENCOUNTER — TELEPHONE ENCOUNTER (OUTPATIENT)
Dept: URBAN - METROPOLITAN AREA CLINIC 68 | Facility: CLINIC | Age: 73
End: 2023-07-27

## 2023-08-01 PROBLEM — 84410009: Status: ACTIVE | Noted: 2023-08-01

## 2023-08-01 PROBLEM — 19943007: Status: ACTIVE | Noted: 2023-08-01

## 2023-08-01 PROBLEM — 197321007: Status: ACTIVE | Noted: 2023-08-01

## 2023-08-03 ENCOUNTER — COMPREHENSIVE EXAM (OUTPATIENT)
Dept: URBAN - METROPOLITAN AREA CLINIC 32 | Facility: CLINIC | Age: 73
End: 2023-08-03

## 2023-08-03 DIAGNOSIS — E11.9: ICD-10-CM

## 2023-08-03 DIAGNOSIS — H26.493: ICD-10-CM

## 2023-08-03 DIAGNOSIS — H35.373: ICD-10-CM

## 2023-08-03 PROCEDURE — 92250 FUNDUS PHOTOGRAPHY W/I&R: CPT

## 2023-08-03 PROCEDURE — 99214 OFFICE O/P EST MOD 30 MIN: CPT

## 2023-08-03 ASSESSMENT — VISUAL ACUITY
OS_SC: 20/25-1
OS_GLARE: 20/50
OD_SC: 20/30
OD_SC: J1
OD_GLARE: 20/60
OU_SC: 20/25-1
OU_SC: J1
OS_SC: J1

## 2023-08-03 ASSESSMENT — KERATOMETRY
OD_K2POWER_DIOPTERS: 46.50
OS_K1POWER_DIOPTERS: 47.00
OD_AXISANGLE_DEGREES: 17
OS_AXISANGLE_DEGREES: 137
OS_K1POWER_DIOPTERS: 46.75
OD_AXISANGLE2_DEGREES: 107
OS_AXISANGLE2_DEGREES: 52
OS_K2POWER_DIOPTERS: 46.25
OS_AXISANGLE_DEGREES: 142
OD_AXISANGLE_DEGREES: 24
OD_K1POWER_DIOPTERS: 47.50
OD_AXISANGLE2_DEGREES: 114
OS_AXISANGLE2_DEGREES: 47

## 2023-08-03 ASSESSMENT — TONOMETRY
OS_IOP_MMHG: 12
OD_IOP_MMHG: 11

## 2023-08-15 ENCOUNTER — CLAIMS CREATED FROM THE CLAIM WINDOW (OUTPATIENT)
Dept: URBAN - METROPOLITAN AREA CLINIC 4 | Facility: CLINIC | Age: 73
End: 2023-08-15
Payer: MEDICARE

## 2023-08-15 ENCOUNTER — CLAIMS CREATED FROM THE CLAIM WINDOW (OUTPATIENT)
Dept: URBAN - METROPOLITAN AREA SURGERY CENTER 12 | Facility: SURGERY CENTER | Age: 73
End: 2023-08-15
Payer: MEDICARE

## 2023-08-15 DIAGNOSIS — K74.60 CIRRHOSIS OF LIVER WITHOUT ASCITES, UNSPECIFIED HEPATIC CIRRHOSIS TYPE: ICD-10-CM

## 2023-08-15 DIAGNOSIS — K31.89 OTHER DISEASES OF STOMACH AND DUODENUM: ICD-10-CM

## 2023-08-15 DIAGNOSIS — I85.10 SECONDARY ESOPHAGEAL VARICES WITHOUT BLEEDING: ICD-10-CM

## 2023-08-15 DIAGNOSIS — K29.70 GASTRITIS, UNSPECIFIED, WITHOUT BLEEDING: ICD-10-CM

## 2023-08-15 DIAGNOSIS — K21.9 GASTRO - ESOPHAGEAL REFLUX DISEASE: ICD-10-CM

## 2023-08-15 DIAGNOSIS — I85.00 VARICES, ESOPHAGEAL: ICD-10-CM

## 2023-08-15 PROCEDURE — 00731 ANES UPR GI NDSC PX NOS: CPT | Performed by: NURSE ANESTHETIST, CERTIFIED REGISTERED

## 2023-08-15 PROCEDURE — 43239 EGD BIOPSY SINGLE/MULTIPLE: CPT | Performed by: SPECIALIST

## 2023-08-15 PROCEDURE — 88305 TISSUE EXAM BY PATHOLOGIST: CPT | Performed by: PATHOLOGY

## 2023-08-15 PROCEDURE — 88342 IMHCHEM/IMCYTCHM 1ST ANTB: CPT | Performed by: PATHOLOGY

## 2023-08-15 PROCEDURE — 43239 EGD BIOPSY SINGLE/MULTIPLE: CPT | Performed by: CLINIC/CENTER

## 2023-08-16 PROBLEM — 14223005: Status: ACTIVE | Noted: 2023-08-16

## 2023-08-21 ENCOUNTER — SURGERY/PROCEDURE (OUTPATIENT)
Dept: URBAN - METROPOLITAN AREA CLINIC 32 | Facility: CLINIC | Age: 73
End: 2023-08-21

## 2023-08-21 DIAGNOSIS — H26.491: ICD-10-CM

## 2023-08-21 PROCEDURE — 66821 AFTER CATARACT LASER SURGERY: CPT

## 2023-08-23 ASSESSMENT — KERATOMETRY
OD_AXISANGLE2_DEGREES: 114
OD_AXISANGLE2_DEGREES: 107
OD_K1POWER_DIOPTERS: 47.50
OS_K1POWER_DIOPTERS: 47.00
OD_AXISANGLE_DEGREES: 24
OD_K2POWER_DIOPTERS: 46.50
OS_AXISANGLE_DEGREES: 142
OS_AXISANGLE2_DEGREES: 47
OD_AXISANGLE_DEGREES: 17
OS_AXISANGLE2_DEGREES: 52
OS_AXISANGLE_DEGREES: 137
OS_K2POWER_DIOPTERS: 46.25
OS_K1POWER_DIOPTERS: 46.75

## 2023-09-11 ENCOUNTER — SURGERY/PROCEDURE (OUTPATIENT)
Dept: URBAN - METROPOLITAN AREA CLINIC 32 | Facility: CLINIC | Age: 73
End: 2023-09-11

## 2023-09-11 DIAGNOSIS — H26.492: ICD-10-CM

## 2023-09-11 PROCEDURE — 66821 AFTER CATARACT LASER SURGERY: CPT

## 2023-09-12 ASSESSMENT — KERATOMETRY
OD_AXISANGLE_DEGREES: 24
OS_K2POWER_DIOPTERS: 46.25
OD_AXISANGLE_DEGREES: 17
OS_K1POWER_DIOPTERS: 46.75
OD_K2POWER_DIOPTERS: 46.50
OS_AXISANGLE_DEGREES: 142
OD_K1POWER_DIOPTERS: 47.50
OS_AXISANGLE2_DEGREES: 52
OS_AXISANGLE2_DEGREES: 47
OS_K1POWER_DIOPTERS: 47.00
OD_AXISANGLE2_DEGREES: 114
OD_AXISANGLE2_DEGREES: 107
OS_AXISANGLE_DEGREES: 137

## 2023-10-25 ENCOUNTER — TELEPHONE ENCOUNTER (OUTPATIENT)
Dept: URBAN - METROPOLITAN AREA CLINIC 68 | Facility: CLINIC | Age: 73
End: 2023-10-25

## 2023-10-27 ENCOUNTER — POST-OP (OUTPATIENT)
Dept: URBAN - METROPOLITAN AREA CLINIC 32 | Facility: CLINIC | Age: 73
End: 2023-10-27

## 2023-10-27 DIAGNOSIS — Z98.890: ICD-10-CM

## 2023-10-27 PROCEDURE — 99024 POSTOP FOLLOW-UP VISIT: CPT

## 2023-10-27 ASSESSMENT — VISUAL ACUITY
OD_SC: J1+
OS_SC: J1+
OD_SC: 20/25-2
OS_SC: 20/30-1

## 2023-10-27 ASSESSMENT — KERATOMETRY
OS_AXISANGLE2_DEGREES: 51
OD_AXISANGLE_DEGREES: 20
OS_K2POWER_DIOPTERS: 46.25
OD_AXISANGLE2_DEGREES: 110
OS_AXISANGLE_DEGREES: 141
OD_K1POWER_DIOPTERS: 47.75
OS_K1POWER_DIOPTERS: 46.75
OD_K2POWER_DIOPTERS: 46.75

## 2023-10-27 ASSESSMENT — TONOMETRY
OD_IOP_MMHG: 11
OS_IOP_MMHG: 12

## 2023-12-14 ENCOUNTER — OFFICE VISIT (OUTPATIENT)
Dept: URBAN - METROPOLITAN AREA CLINIC 68 | Facility: CLINIC | Age: 73
End: 2023-12-14

## 2023-12-18 ENCOUNTER — OFFICE VISIT (OUTPATIENT)
Dept: URBAN - METROPOLITAN AREA CLINIC 68 | Facility: CLINIC | Age: 73
End: 2023-12-18
Payer: MEDICARE

## 2023-12-18 ENCOUNTER — LAB OUTSIDE AN ENCOUNTER (OUTPATIENT)
Dept: URBAN - METROPOLITAN AREA CLINIC 68 | Facility: CLINIC | Age: 73
End: 2023-12-18

## 2023-12-18 VITALS
BODY MASS INDEX: 24.27 KG/M2 | OXYGEN SATURATION: 98 % | HEART RATE: 86 BPM | HEIGHT: 63 IN | SYSTOLIC BLOOD PRESSURE: 140 MMHG | WEIGHT: 137 LBS | DIASTOLIC BLOOD PRESSURE: 80 MMHG

## 2023-12-18 DIAGNOSIS — I85.10 SECONDARY ESOPHAGEAL VARICES WITHOUT BLEEDING: ICD-10-CM

## 2023-12-18 DIAGNOSIS — K74.60 CIRRHOSIS OF LIVER WITHOUT ASCITES, UNSPECIFIED HEPATIC CIRRHOSIS TYPE: ICD-10-CM

## 2023-12-18 DIAGNOSIS — Z86.010 PERSONAL HISTORY OF COLONIC POLYPS: ICD-10-CM

## 2023-12-18 DIAGNOSIS — E11.8 TYPE 2 DIABETES MELLITUS WITH UNSPECIFIED COMPLICATIONS: ICD-10-CM

## 2023-12-18 DIAGNOSIS — F11.11 HX OF OPIOID ABUSE: ICD-10-CM

## 2023-12-18 DIAGNOSIS — K76.0 FATTY (CHANGE OF) LIVER, NOT ELSEWHERE CLASSIFIED: ICD-10-CM

## 2023-12-18 DIAGNOSIS — K31.89 OTHER DISEASES OF STOMACH AND DUODENUM: ICD-10-CM

## 2023-12-18 DIAGNOSIS — K59.09 OPIOID CONSTIPATION, CHRONIC: ICD-10-CM

## 2023-12-18 DIAGNOSIS — K75.81 NASH (NONALCOHOLIC STEATOHEPATITIS): ICD-10-CM

## 2023-12-18 DIAGNOSIS — K21.00 GASTRO-ESOPHAGEAL REFLUX DISEASE WITH ESOPHAGITIS: ICD-10-CM

## 2023-12-18 PROCEDURE — 99214 OFFICE O/P EST MOD 30 MIN: CPT | Performed by: SPECIALIST

## 2023-12-18 RX ORDER — NALOXEGOL OXALATE 25 MG/1
1 TABLET IN THE MORNING TABLET, FILM COATED ORAL ONCE A DAY
Qty: 30 | OUTPATIENT
Start: 2023-12-18 | End: 2024-01-17

## 2023-12-18 RX ORDER — ASPIRIN 81 MG/1
LOW-DOSE ASPIRIN( 81MG ORAL  DAILY ) ACTIVE -HX ENTRY TABLET, COATED ORAL DAILY
Status: DISCONTINUED | COMMUNITY
Start: 2022-03-03

## 2023-12-18 RX ORDER — METFORMIN HCL 500 MG/1
METFORMIN HCL( 500MG ORAL 1 TWO TIMES DAILY ) ACTIVE -HX ENTRY TABLET ORAL
Status: ACTIVE | COMMUNITY
Start: 2022-03-03

## 2023-12-18 RX ORDER — EMPAGLIFLOZIN 10 MG/1
1 TABLET TABLET, FILM COATED ORAL ONCE A DAY
Status: DISCONTINUED | COMMUNITY

## 2023-12-18 RX ORDER — OMEPRAZOLE 40 MG/1
OMEPRAZOLE( 40MG ORAL 1 DAILY ) ACTIVE -HX ENTRY CAPSULE, DELAYED RELEASE ORAL DAILY
Status: ACTIVE | COMMUNITY
Start: 2022-03-03

## 2023-12-18 RX ORDER — AZATHIOPRINE 50 MG/1
AZATHIOPRINE( 50MG ORAL 1 DAILY ) ACTIVE -HX ENTRY TABLET ORAL DAILY
Status: ACTIVE | COMMUNITY
Start: 2022-03-03

## 2023-12-18 RX ORDER — ROSUVASTATIN CALCIUM 20 MG/1
1 TABLET TABLET, COATED ORAL ONCE A DAY
Status: ACTIVE | COMMUNITY

## 2023-12-18 RX ORDER — POLYETHYLENE GLYCOL 3350 17 G/17G
ONE SCOOP POWDER, FOR SOLUTION ORAL ONCE DAILY
Qty: 1 EACH | Refills: 11 | OUTPATIENT
Start: 2023-12-18 | End: 2024-12-12

## 2023-12-18 RX ORDER — OMEPRAZOLE 40 MG/1
OMEPRAZOLE( 40MG ORAL 1 DAILY ) ACTIVE -HX ENTRY CAPSULE, DELAYED RELEASE ORAL DAILY
OUTPATIENT

## 2023-12-18 RX ORDER — CHLORHEXIDINE GLUCONATE 4 %
MULTIVITAMIN ADULTS(  ORAL   ) ACTIVE -HX ENTRY LIQUID (ML) TOPICAL
Status: DISCONTINUED | COMMUNITY
Start: 2022-03-03

## 2023-12-18 RX ORDER — TIZANIDINE HYDROCHLORIDE 4 MG/1
ZANAFLEX( 4MG ORAL 1 TWO TIMES DAILY ) ACTIVE -HX ENTRY CAPSULE ORAL
Status: ACTIVE | COMMUNITY
Start: 2022-03-03

## 2023-12-18 NOTE — HPI-TODAY'S VISIT:
The patient is complaining of worsening constipation.  She has chronic neck and hip pain for which she uses oxycodone.  She was taking it once a day now she is increased to twice daily due to increased pain.Her bowel movements are difficult, and she may skip up to several days or 1 week Patient had a relatively recent colonoscopy negative for obstruction or lesions Patient has a known history of cryptogenic cirrhosis/fatty infiltration last EGD August 2023 revealed stable grade 1 varices Patient denies any heavy alcohol use  IMPRESSION cirrhosis cryptogenic/fatty infiltration/  [place]  Opioid-induced constipation Diminutive varices that are stable Patient is a candidate for investigational therapy for fatty liver and she agrees to pursue potential investigational medication  PLAN Add Movantik daily Surveillance labs including AFP hepatic profile CBC and ultrasound Upper endoscopy August 2023 Follow-up office visit determine further testing is required including investigating constipation Investigational therapy for fatty liver will be pursued PRIOR   Known history of cirrhosis cryptogenic probably due to steatohepatitis no significant alcohol history in the past patient does have obesity and diabetes currently on therapy with Jardiance and Elsy blood glucose level was 150 Patient was seen to have grade 1 varices in 2022 EGD 2022 colonoscopy negative for lesions No sign of ascites bleeding or other complications  Last FibroScan F3 fibrosis 1 year ago No alarm symptoms of lower GI bleeding or other symptoms

## 2024-01-09 ENCOUNTER — OFFICE VISIT (OUTPATIENT)
Dept: URBAN - METROPOLITAN AREA CLINIC 67 | Facility: CLINIC | Age: 74
End: 2024-01-09
Payer: MEDICARE

## 2024-01-09 DIAGNOSIS — K76.0 FATTY (CHANGE OF) LIVER, NOT ELSEWHERE CLASSIFIED: ICD-10-CM

## 2024-01-09 DIAGNOSIS — Z90.49 ACQUIRED ABSENCE OF OTHER SPECIFIED PARTS OF DIGESTIVE TRACT: ICD-10-CM

## 2024-01-09 PROCEDURE — 76705 ECHO EXAM OF ABDOMEN: CPT | Performed by: SPECIALIST

## 2024-01-09 PROCEDURE — 93976 VASCULAR STUDY: CPT | Performed by: SPECIALIST

## 2024-01-16 ENCOUNTER — DASHBOARD ENCOUNTERS (OUTPATIENT)
Age: 74
End: 2024-01-16